# Patient Record
Sex: MALE | Race: WHITE | NOT HISPANIC OR LATINO | Employment: FULL TIME | URBAN - METROPOLITAN AREA
[De-identification: names, ages, dates, MRNs, and addresses within clinical notes are randomized per-mention and may not be internally consistent; named-entity substitution may affect disease eponyms.]

---

## 2017-01-06 ENCOUNTER — OFFICE VISIT (OUTPATIENT)
Dept: URGENT CARE | Facility: URGENT CARE | Age: 22
End: 2017-01-06
Payer: COMMERCIAL

## 2017-01-06 VITALS
HEART RATE: 85 BPM | TEMPERATURE: 98.9 F | SYSTOLIC BLOOD PRESSURE: 118 MMHG | RESPIRATION RATE: 16 BRPM | DIASTOLIC BLOOD PRESSURE: 70 MMHG | OXYGEN SATURATION: 97 %

## 2017-01-06 DIAGNOSIS — J02.9 ACUTE PHARYNGITIS, UNSPECIFIED ETIOLOGY: Primary | ICD-10-CM

## 2017-01-06 DIAGNOSIS — J20.9 ACUTE BRONCHITIS WITH COEXISTING CONDITION REQUIRING PROPHYLACTIC TREATMENT: ICD-10-CM

## 2017-01-06 LAB
DEPRECATED S PYO AG THROAT QL EIA: NORMAL
MICRO REPORT STATUS: NORMAL
SPECIMEN SOURCE: NORMAL

## 2017-01-06 PROCEDURE — 87081 CULTURE SCREEN ONLY: CPT | Performed by: FAMILY MEDICINE

## 2017-01-06 PROCEDURE — 99213 OFFICE O/P EST LOW 20 MIN: CPT | Performed by: FAMILY MEDICINE

## 2017-01-06 PROCEDURE — 87880 STREP A ASSAY W/OPTIC: CPT | Performed by: FAMILY MEDICINE

## 2017-01-06 RX ORDER — ALBUTEROL SULFATE 90 UG/1
1-2 AEROSOL, METERED RESPIRATORY (INHALATION) EVERY 4 HOURS PRN
Qty: 1 INHALER | Refills: 0 | Status: SHIPPED | OUTPATIENT
Start: 2017-01-06 | End: 2018-02-22

## 2017-01-06 RX ORDER — AZITHROMYCIN 500 MG/1
500 TABLET, FILM COATED ORAL DAILY
Qty: 6 TABLET | Refills: 0 | Status: SHIPPED | OUTPATIENT
Start: 2017-01-06 | End: 2017-01-12

## 2017-01-06 NOTE — MR AVS SNAPSHOT
After Visit Summary   1/6/2017    Iraj Leija    MRN: [id number]           Patient Information     Date Of Birth          1995        Visit Information        Provider Department      1/6/2017 12:30 PM Anahy Rodriguez MD [facility]        Today's Diagnoses     Acute pharyngitis, unspecified etiology    -  1     Acute bronchitis with coexisting condition requiring prophylactic treatment           Care Instructions      Acute Bronchitis  Your healthcare provider has told you that you have acute bronchitis. Bronchitis is infection or inflammation of the bronchial tubes (airways in the lungs). Normally, air moves easily in and out of the airways. Bronchitis narrows the airways, making it harder for air to flow in and out of the lungs. This causes symptoms such as shortness of breath, coughing, and wheezing. Bronchitis can be  acute  or  chronic.  Acute means the condition comes on quickly and goes away in a short time. Chronic means a condition lasts a long time and often comes back. Read on to learn more about acute bronchitis.    What causes acute bronchitis?  Acute bronchitis almost always starts as a viral respiratory infection, such as a cold or the flu. Certain factors make it more likely for a cold or flu to turn into bronchitis. These include being very young or very old or having a heart or lung problem. Cigarette smoking also makes bronchitis more likely.  When bronchitis develops, the airways become swollen. The airways may also become infected with bacteria. This is known as a secondary infection.  Diagnosing acute bronchitis  Your healthcare provider will examine you and ask about your symptoms and health history. You may also have a sputum culture to test the fluid in your lungs. Chest X-rays may be done to look for infection in the lungs.  Treating acute bronchitis  Bronchitis usually clears up as the cold or flu goes away. You can help feel better faster by  doing the following:    Take medicine as directed. You may be told to take ibuprofen or other over-the-counter medicines. These help relieve inflammation in your bronchial tubes. Your doctor may prescribe an inhaler to help open up the bronchial tubes. Most of the time, acute bronchitis is caused by a viral infection. Antibiotics are usually not prescribed for viral infections.    Drink plenty of fluids, such as water, juice, or warm soup. Fluids loosen mucus so that you can cough it up. This helps you breathe more easily. Fluids also prevent dehydration.    Make sure you get plenty of rest.    Do not smoke. Do not allow anyone else to smoke in your home.  Recovery and follow-up  Follow up with your doctor as you are told. You will likely feel better in a week or two. But a dry cough can linger beyond that time. Let your doctor know if you still have symptoms (other than a dry cough) after 2 weeks. If you re prone to getting bronchial infections, let your doctor know. And take steps to protect yourself from future infections. These steps include stopping smoking and avoiding tobacco smoke, washing your hands often, and getting a yearly flu shot.  When to call the doctor  Call the doctor if you have any of the following:    Fever of 100.4 F (38.0 C) higher    Symptoms that get worse, or new symptoms    Trouble breathing    Symptoms that don t start to improve within a week, or within 3 days of taking antibiotics     5930-7961 [employer]. [address] [place] [address], [place] [address]. All rights reserved. This information is not intended as a substitute for professional medical care. Always follow your healthcare professional's instructions.              Follow-ups after your visit        Who to contact     If you have questions or need follow up information about today's clinic visit or your schedule please contact [facility] directly at [phone].  Normal or non-critical lab and imaging  "results will be communicated to you by MyChart, letter or phone within 4 business days after the clinic has received the results. If you do not hear from us within 7 days, please contact the clinic through [employer] or phone. If you have a critical or abnormal lab result, we will notify you by phone as soon as possible.  Submit refill requests through [employer] or call your pharmacy and they will forward the refill request to us. Please allow 3 business days for your refill to be completed.          Additional Information About Your Visit        [employer] Information     [employer] lets you send messages to your doctor, view your test results, renew your prescriptions, schedule appointments and more. To sign up, go to [email or web address] . Click on \"Log in\" on the left side of the screen, which will take you to the Welcome page. Then click on \"Sign up Now\" on the right side of the page.     You will be asked to enter the access code listed below, as well as some personal information. Please follow the directions to create your username and password.     Your access code is: [email or web address]  Expires: 2017  1:06 PM     Your access code will  in 90 days. If you need help or a new code, please call your [facility] clinic or [phone].        Care EveryWhere ID     This is your Care EveryWhere ID. This could be used by other organizations to access your [facility] medical records  [id number]        Your Vitals Were     Pulse Temperature Respirations Pulse Oximetry          85 98.9  F (37.2  C) (Oral) 16 97%         Blood Pressure from Last 3 Encounters:   17 118/70   05/15/15 122/60   03/15/12 124/74    Weight from Last 3 Encounters:   05/15/15 161 lb 8 oz (73.256 kg)              We Performed the Following     Beta strep group A culture     Rapid strep screen          Today's Medication Changes          These changes are accurate as of: 17  1:06 PM.  If you have any questions, ask your nurse or doctor.    "            Start taking these medicines.        Dose/Directions    albuterol 108 (90 BASE) MCG/ACT Inhaler   Commonly known as:  PROAIR HFA/PROVENTIL HFA/VENTOLIN HFA   Used for:  Acute bronchitis with coexisting condition requiring prophylactic treatment   Started by:  Anahy Rodriguez MD        Dose:  1-2 puff   Inhale 1-2 puffs into the lungs every 4 hours as needed for shortness of breath / dyspnea or wheezing   Quantity:  1 Inhaler   Refills:  0       azithromycin 500 MG tablet   Commonly known as:  ZITHROMAX   Used for:  Acute bronchitis with coexisting condition requiring prophylactic treatment   Started by:  Anahy Rodriguez MD        Dose:  500 mg   Take 1 tablet (500 mg) by mouth daily for 6 days   Quantity:  6 tablet   Refills:  0            Where to get your medicines      These medications were sent to [facility] - [address]   [address]  [address], [place] [address]     Phone:  [phone]    - albuterol 108 (90 BASE) MCG/ACT Inhaler  - azithromycin 500 MG tablet             Primary Care Provider    None       No address on file        Thank you!     Thank you for choosing [facility]  for your care. Our goal is always to provide you with excellent care. Hearing back from our patients is one way we can continue to improve our services. Please take a few minutes to complete the written survey that you may receive in the mail after your visit with us. Thank you!             Your Updated Medication List - Protect others around you: Learn how to safely use, store and throw away your medicines at www.disposemymeds.org.          This list is accurate as of: 1/6/17  1:06 PM.  Always use your most recent med list.                   Brand Name Dispense Instructions for use    albuterol 108 (90 BASE) MCG/ACT Inhaler    PROAIR HFA/PROVENTIL HFA/VENTOLIN HFA    1 Inhaler    Inhale 1-2 puffs into the lungs every 4 hours as needed for  shortness of breath / dyspnea or wheezing       azithromycin 500 MG tablet    ZITHROMAX    6 tablet    Take 1 tablet (500 mg) by mouth daily for 6 days       CLONAZEPAM PO      Take 0.5 mg by mouth 4 times daily.       PARoxetine 30 MG tablet    PAXIL     Take 30 mg by mouth every morning

## 2017-01-06 NOTE — PROGRESS NOTES
SUBJECTIVE:  Chief Complaint   Patient presents with     Urgent Care     pt c/o ST and pink eye x 1 wk ago--[facility] -RXd eye drops --pt finished those --since then L ear pn and ST still worse when he coughs     Iraj Leija is a 21 year old male who presents to the clinic today with a chief complaint of cough , shortness of breath. and wheezing. for 1 week(s).  Patient denies central chest pain. and pleuritic chest pain  His cough is described as persistent, daytime, nightime, productive of yellow sputum and wheezing.    The patient's symptoms are moderate and worsening.  Associated symptoms include malaise and ear pain. The patient's symptoms are exacerbated by no particular triggers  Patient has been using OTC cough suppressants  to improve symptoms.    No past medical history on file.    ALLERGIES:  Review of patient's allergies indicates no known allergies.      Current Outpatient Prescriptions on File Prior to Visit:  PARoxetine (PAXIL) 30 MG tablet Take 30 mg by mouth every morning   CLONAZEPAM PO Take 0.5 mg by mouth 4 times daily.     No current facility-administered medications on file prior to visit.    Social History   Substance Use Topics     Smoking status: Never Smoker      Smokeless tobacco: Never Used     Alcohol Use: No       Family History   Problem Relation Age of Onset     DIABETES Mother      Depression Mother      Anxiety Disorder Father          ROS  INTEGUMENTARY/SKIN: NEGATIVE for worrisome rashes, moles or lesions  EYES: NEGATIVE for vision changes or irritation  GI: NEGATIVE for nausea, abdominal pain, heartburn, or change in bowel habits    OBJECTIVE:  /70 mmHg  Pulse 85  Temp(Src) 98.9  F (37.2  C) (Oral)  Resp 16  SpO2 97%  GENERAL APPEARANCE: alert, mild distress and cooperative  EYES: EOMI,  PERRL, conjunctiva clear  HENT: ear canals and TM's normal.  Nose and mouth without ulcers, erythema or lesions  NECK: supple, nontender, no lymphadenopathy  RESP:  lungs clear to auscultation - no rales, rhonchi or wheezes  CV: regular rates and rhythm, normal S1 S2, no murmur noted  NEURO: Normal strength and tone, sensory exam grossly normal,  normal speech and mentation  SKIN: no suspicious lesions or rashes       ASSESSMENT:    Acute pharyngitis, unspecified etiology     - Rapid strep screen  - Beta strep group A culture    Acute bronchitis with coexisting condition requiring prophylactic treatment      - azithromycin (ZITHROMAX) 500 MG tablet; Take 1 tablet (500 mg) by mouth daily for 6 days  - albuterol (PROAIR HFA/PROVENTIL HFA/VENTOLIN HFA) 108 (90 BASE) MCG/ACT Inhaler; Inhale 1-2 puffs into the lungs every 4 hours as needed for shortness of breath / dyspnea or wheezing    We discussed that there is an outbreak of pertussis in the [facility], so there is increased risk of developing the illness.  We discussed antibiotic treatment and prophylaxis of pertussis.       Symptomatic measures encouraged, humidified air, plenty of fluids.  Patient may consider OTC expectorant and/or cough suppressant to treat symptoms.  Return if worsening

## 2017-01-06 NOTE — NURSING NOTE
"Chief Complaint   Patient presents with     Urgent Care     pt c/o ST and pink eye x 1 wk ago--[facility] -[facility]d eye drops --pt finished those --since then L ear pn and ST still worse when he coughs      Initial /70 mmHg  Pulse 85  Temp(Src) 98.9  F (37.2  C) (Oral)  Resp 16  SpO2 97% Estimated body mass index is 23.68 kg/(m^2) as calculated from the following:    Height as of 5/15/15: 5' 9.25\" (1.759 m).    Weight as of 5/15/15: 161 lb 8 oz (73.256 kg)..  BP completed using cuff size: edita Saunders CMA ([facility])    "

## 2017-01-06 NOTE — PATIENT INSTRUCTIONS
Acute Bronchitis  Your healthcare provider has told you that you have acute bronchitis. Bronchitis is infection or inflammation of the bronchial tubes (airways in the lungs). Normally, air moves easily in and out of the airways. Bronchitis narrows the airways, making it harder for air to flow in and out of the lungs. This causes symptoms such as shortness of breath, coughing, and wheezing. Bronchitis can be  acute  or  chronic.  Acute means the condition comes on quickly and goes away in a short time. Chronic means a condition lasts a long time and often comes back. Read on to learn more about acute bronchitis.    What causes acute bronchitis?  Acute bronchitis almost always starts as a viral respiratory infection, such as a cold or the flu. Certain factors make it more likely for a cold or flu to turn into bronchitis. These include being very young or very old or having a heart or lung problem. Cigarette smoking also makes bronchitis more likely.  When bronchitis develops, the airways become swollen. The airways may also become infected with bacteria. This is known as a secondary infection.  Diagnosing acute bronchitis  Your healthcare provider will examine you and ask about your symptoms and health history. You may also have a sputum culture to test the fluid in your lungs. Chest X-rays may be done to look for infection in the lungs.  Treating acute bronchitis  Bronchitis usually clears up as the cold or flu goes away. You can help feel better faster by doing the following:    Take medicine as directed. You may be told to take ibuprofen or other over-the-counter medicines. These help relieve inflammation in your bronchial tubes. Your doctor may prescribe an inhaler to help open up the bronchial tubes. Most of the time, acute bronchitis is caused by a viral infection. Antibiotics are usually not prescribed for viral infections.    Drink plenty of fluids, such as water, juice, or warm soup. Fluids loosen mucus so  that you can cough it up. This helps you breathe more easily. Fluids also prevent dehydration.    Make sure you get plenty of rest.    Do not smoke. Do not allow anyone else to smoke in your home.  Recovery and follow-up  Follow up with your doctor as you are told. You will likely feel better in a week or two. But a dry cough can linger beyond that time. Let your doctor know if you still have symptoms (other than a dry cough) after 2 weeks. If you re prone to getting bronchial infections, let your doctor know. And take steps to protect yourself from future infections. These steps include stopping smoking and avoiding tobacco smoke, washing your hands often, and getting a yearly flu shot.  When to call the doctor  Call the doctor if you have any of the following:    Fever of 100.4 F (38.0 C) higher    Symptoms that get worse, or new symptoms    Trouble breathing    Symptoms that don t start to improve within a week, or within 3 days of taking antibiotics     [id number] [employer]. [address] [place] [address], [place] [address]. All rights reserved. This information is not intended as a substitute for professional medical care. Always follow your healthcare professional's instructions.

## 2017-01-08 LAB
BACTERIA SPEC CULT: NORMAL
MICRO REPORT STATUS: NORMAL
SPECIMEN SOURCE: NORMAL

## 2018-02-22 ENCOUNTER — APPOINTMENT (OUTPATIENT)
Dept: GENERAL RADIOLOGY | Facility: CLINIC | Age: 23
End: 2018-02-22
Attending: EMERGENCY MEDICINE
Payer: COMMERCIAL

## 2018-02-22 ENCOUNTER — HOSPITAL ENCOUNTER (EMERGENCY)
Facility: CLINIC | Age: 23
Discharge: HOME | End: 2018-02-22
Attending: EMERGENCY MEDICINE | Admitting: EMERGENCY MEDICINE
Payer: COMMERCIAL

## 2018-02-22 VITALS
SYSTOLIC BLOOD PRESSURE: 142 MMHG | DIASTOLIC BLOOD PRESSURE: 72 MMHG | RESPIRATION RATE: 16 BRPM | OXYGEN SATURATION: 96 % | HEIGHT: 69 IN | WEIGHT: 175 LBS | TEMPERATURE: 98.8 F | BODY MASS INDEX: 25.92 KG/M2 | HEART RATE: 85 BPM

## 2018-02-22 DIAGNOSIS — S93.401A SPRAIN OF RIGHT ANKLE, UNSPECIFIED LIGAMENT, INITIAL ENCOUNTER: ICD-10-CM

## 2018-02-22 PROCEDURE — 73610 X-RAY EXAM OF ANKLE: CPT | Mod: RT

## 2018-02-22 PROCEDURE — 99284 EMERGENCY DEPT VISIT MOD MDM: CPT | Mod: Z6 | Performed by: EMERGENCY MEDICINE

## 2018-02-22 PROCEDURE — 99283 EMERGENCY DEPT VISIT LOW MDM: CPT | Performed by: EMERGENCY MEDICINE

## 2018-02-22 ASSESSMENT — ENCOUNTER SYMPTOMS
ARTHRALGIAS: 1
JOINT SWELLING: 0
NUMBNESS: 0
HEADACHES: 0
LIGHT-HEADEDNESS: 0

## 2018-02-22 NOTE — ED AVS SNAPSHOT
[facility], Emergency Department    [address] [facility] [address]    Phone:  [phone]                                       Iraj Leija   MRN: [id number]    Department:  [facility], Emergency Department   Date of Visit:  2/22/2018           Patient Information     Date Of Birth          1995        Your diagnoses for this visit were:     Sprain of right ankle, unspecified ligament, initial encounter        You were seen by Bk Sofia MD.        Discharge Instructions         ACE Wrap  Minor muscle or joint injuries are often treated with an elastic bandage. The bandage provides support and compression to the injured area. An elastic bandage is a stretchy, rolled bandage. Elastic bandages range in width from 2 to 6 inches. They can be used for a variety of injuries. The bandages are often called ACE bandages, after the most common brand name.  If used correctly, elastic bandages help control swelling and ease pain. An elastic bandage is also a good reminder not to overuse the injured area. However, elastic bandages do not provide a lot of support and will not prevent reinjury.  Home care    To apply an elastic bandage:    Check the skin before wrapping the injury. It should be clean, dry, and free of drainage.    Start wrapping below the injury and work your way toward the body. For an ankle sprain, start wrapping around the foot and work up toward the calf. This will help control swelling.    Overlap the edges of the bandage so it stays snuggly in place.    Wrap the bandage firmly, but not too tightly. A tight bandage can increase swelling on either end of the bandage. Make sure the bandage is wrinkle free.    Leave fingers and toes exposed.    Secure ends of the bandage (even self-sticking ones) with clips or tape.    Check frequently to ensure adequate circulation, especially in the fingers and toes. Loosen the bandage if there is local swelling, numbness, tingling,  discomfort, coldness, or discoloration (skin pale or bluish in color).    Rewrap the bandage as needed during the day. Reroll the bandage as you unwind it.  Continue using the elastic bandage until the pain and swelling are gone or as your healthcare provider advises.  If you have been told to ice the area, the ice can be secured in place with the elastic bandage. Wrap the ice pack with a thin towel to protect the skin. Do not put ice or an ice pack directly on the skin.  Ice the area for no more than 20 minutes at a time.    Follow-up care  Follow up with your healthcare provider, as advised.  When to seek medical advice  Call your healthcare provider for any of the following:    Pain and swelling that doesn't get better or gets worse    Trouble moving injured area    Skin discoloration, numbness, or tingling that doesn t go away after bandage is removed  Date Last Reviewed: 9/13/2015 2000-2017 [employer]. [address] [place] [address]. All rights reserved. This information is not intended as a substitute for professional medical care. Always follow your healthcare professional's instructions.          Understanding Ankle Sprain    The ankle is the joint where the leg and foot meet. Bones are held in place by connective tissue called ligaments. When ankle ligaments are stretched to the point of pain and injury, it is called an ankle sprain. A sprain can tear the ligaments. These tears can be very small but still cause pain. Ankle sprains can be mild or severe.  What causes an ankle sprain?  A sprain may occur when you twist your ankle or bend it too far. This can happen when you stumble or fall. Things that can make an ankle sprain more likely include:    Having had an ankle sprain before    Playing sports that involve running and jumping. Or playing contact sports such as football or hockey.    Wearing shoes that don t support your feet and ankles well    Having ankles with poor  strength and flexibility  Symptoms of an ankle sprain  Symptoms may include:    Pain or soreness in the ankle    Swelling    Redness or bruising    Not being able to walk or put weight on the affected foot    Reduced range of motion in the ankle    A popping or tearing feeling at the time the sprain occurs    An abnormal or dislocated look to the ankle    Instability or too much range of motion in the ankle  Treatment for an ankle sprain  Treatment focuses on reducing pain and swelling, and avoiding further injury. Treatments may include:    Resting the ankle. Avoid putting weight on it. This may mean using crutches until the sprain heals.    Prescription or over-the-counter pain medicines. These help reduce swelling and pain.    Cold packs. These help reduce pain and swelling.    Raising your ankle above your heart. This helps reduce swelling.    Wrapping the ankle with an elastic bandage or ankle brace. This helps reduce swelling and gives some support to the ankle. In rare cases, you may need a cast or boot.    Stretching and other exercises. These improve flexibility and strength.    Heat packs. These may be recommended before doing ankle exercises.  Possible complications of an ankle sprain  An ankle that has been weakened by a sprain can be more likely to have repeated sprains afterward. Doing exercises to strengthen your ankle and improve balance can reduce your risk for repeated sprains. Other possible complications are long-term (chronic) pain or an ankle that remains unstable.  When to call your healthcare provider  Call your healthcare provider right away if you have any of these:    Fever of 100.4 F (38 C) or higher, or as directed    Pain, numbness, discoloration, or coldness in the foot or toes    Pain that gets worse    Symptoms that don t get better, or get worse    New symptoms   Date Last Reviewed: 3/10/2016    9262-8925 [employer]. [address] [facility], [place] [address]. All  rights reserved. This information is not intended as a substitute for professional medical care. Always follow your healthcare professional's instructions.          24 Hour Appointment Hotline       To make an appointment at any [facility], call [phone] ([phone]). If you don't have a family doctor or clinic, we will help you find one. [facility] clinics are conveniently located to serve the needs of you and your family.             Review of your medicines      Our records show that you are taking the medicines listed below. If these are incorrect, please call your family doctor or clinic.        Dose / Directions Last dose taken    CLONAZEPAM PO   Dose:  0.5 mg        Take 0.5 mg by mouth 4 times daily.   Refills:  0        PARoxetine 30 MG tablet   Commonly known as:  PAXIL   Dose:  30 mg        Take 30 mg by mouth every morning   Refills:  0                Procedures and tests performed during your visit     XR Ankle Right G/E 3 Views      Orders Needing Specimen Collection     None      Pending Results     No orders found from 2/20/2018 to 2/23/2018.            Pending Culture Results     No orders found from 2/20/2018 to 2/23/2018.            Pending Results Instructions     If you had any lab results that were not finalized at the time of your Discharge, you can call the ED Lab Result RN at [phone]. You will be contacted by this team for any positive Lab results or changes in treatment. The nurses are available 7 days a week from 10A to 6:30P.  You can leave a message 24 hours per day and they will return your call.        Thank you for choosing [facility]       Thank you for choosing [facility] for your care. Our goal is always to provide you with excellent care. Hearing back from our patients is one way we can continue to improve our services. Please take a few minutes to complete the written survey that you may receive in the mail after you visit with us. Thank you!        Jaylen  "Information     [employer] lets you send messages to your doctor, view your test results, renew your prescriptions, schedule appointments and more. To sign up, go to [email or web address] . Click on \"Log in\" on the left side of the screen, which will take you to the Welcome page. Then click on \"Sign up Now\" on the right side of the page.     You will be asked to enter the access code listed below, as well as some personal information. Please follow the directions to create your username and password.     Your access code is: [email or web address]  Expires: 2018  9:44 PM     Your access code will  in 90 days. If you need help or a new code, please call your [facility] clinic or [phone].        Care EveryWhere ID     This is your Care EveryWhere ID. This could be used by other organizations to access your [facility] medical records  [id number]        Equal Access to Services     NADYA VELOZ AH: Hadii syeda liuo Sokhang, waaxda luqadaha, qaybta kaalmada adesuzanne, bora tineo . So [facility] [phone].    ATENCIÓN: Si habla español, tiene a conti disposición servicios gratuitos de asistencia lingüística. Llame al [phone].    We comply with applicable federal civil rights laws and [place] laws. We do not discriminate on the basis of race, color, national origin, age, disability, sex, sexual orientation, or gender identity.            After Visit Summary       This is your record. Keep this with you and show to your community pharmacist(s) and doctor(s) at your next visit.                  "

## 2018-02-22 NOTE — ED AVS SNAPSHOT
[facility], [place], Emergency Department    [address] [place] [address]    Phone:  [phone]                                       Iraj Leija   MRN: [id number]    Department:  [facility], Emergency Department   Date of Visit:  2/22/2018           After Visit Summary Signature Page     I have received my discharge instructions, and my questions have been answered. I have discussed any challenges I see with this plan with the nurse or doctor.    ..........................................................................................................................................  Patient/Patient Representative Signature      ..........................................................................................................................................  Patient Representative Print Name and Relationship to Patient    ..................................................               ................................................  Date                                            Time    ..........................................................................................................................................  Reviewed by Signature/Title    ...................................................              ..............................................  Date                                                            Time

## 2018-02-23 NOTE — ED PROVIDER NOTES
History     Chief Complaint   Patient presents with     Ankle Pain     HPI  Iraj Leija is an otherwise healthy 22 year old male who presents to the ED for evaluation of right ankle pain. The patient was walking this evening when he suffered an inversion-type injury of his right ankle, causing him significant pain with ambulation and weight bearing. The patient was ambulatory immediatly following the injury, and he was able to walk into the ED. No swelling, numbness, or tingling, and he reports pain over the lateral aspect of his right ankle and achilles tendon. The patient offers no other concerns or complaints at this time.      PAST MEDICAL HISTORY:   Past Medical History:   Diagnosis Date     Depressive disorder        PAST SURGICAL HISTORY:   Past Surgical History:   Procedure Laterality Date     ADENOIDECTOMY      as a child       FAMILY HISTORY:   Family History   Problem Relation Age of Onset     DIABETES Mother      Depression Mother      Anxiety Disorder Father        SOCIAL HISTORY:   Social History   Substance Use Topics     Smoking status: Never Smoker     Smokeless tobacco: Never Used     Alcohol use 0.0 oz/week     0 Standard drinks or equivalent per week      Comment: 2-3 beers/wk       Discharge Medication List as of 2/22/2018  9:44 PM      CONTINUE these medications which have NOT CHANGED    Details   PARoxetine (PAXIL) 30 MG tablet Take 30 mg by mouth every morning, Historical      CLONAZEPAM PO Take 0.5 mg by mouth 4 times daily., 0.5 mg, Oral, 4 TIMES DAILY, Until Discontinued, Historical              No Known Allergies        I have reviewed the Medications, Allergies, Past Medical and Surgical History, and Social History in the Epic system.    Review of Systems   Musculoskeletal: Positive for arthralgias. Negative for joint swelling.   Neurological: Negative for syncope, light-headedness, numbness and headaches.   All other systems reviewed and are negative.      Physical Exam   BP:  "142/72  Pulse: 85  Temp: 98.8  F (37.1  C)  Resp: 16  Height: 175.3 cm (5' 9\")  Weight: 79.4 kg (175 lb)  SpO2: 96 %      Physical Exam   Constitutional: He is oriented to person, place, and time. He appears well-developed and well-nourished. No distress.   HENT:   Head: Normocephalic and atraumatic.   Eyes: No scleral icterus.   Neck: Normal range of motion. Neck supple.   Cardiovascular: Normal rate.    Pulmonary/Chest: Effort normal.   Musculoskeletal:        Right ankle: Normal.   Neurological: He is alert and oriented to person, place, and time.   Skin: Skin is warm and dry. No rash noted. He is not diaphoretic. No erythema. No pallor.       ED Course     ED Course     Procedures             Critical Care time:  none     Results for orders placed or performed during the hospital encounter of 02/22/18   XR Ankle Right G/E 3 Views    Narrative    XR ANKLE RT G/E 3 VW  2/22/2018 8:53 PM    History: Trauma    Comparison: None available    Findings:   AP, oblique, and lateral views of the right ankle are obtained. The  alignment at the ankle joint is maintained. No medial clear space  widening. No acute fractures identified. There is a normal variant  accessory os trigonum posterior to the talus on the lateral view.  There is no significant soft tissue abnormality.      Impression    IMPRESSION:  No acute osseous abnormality.    I have personally reviewed the examination and initial interpretation  and I agree with the findings.    RUDDY MURPHY MD               Labs Ordered and Resulted from Time of ED Arrival Up to the Time of Departure from the ED - No data to display         Assessments & Plan (with Medical Decision Making)   This is a 22-year-old male coming into emergency room with complaints of right ankle pain after slipping on some ice.  Initially he states that he was able to bear weight but was very tender.  On my assessment here in the emergency room he is now able to bear weight and has no obvious " limp.  His ankle exam shows no obvious swelling or tenderness throughout.  X-ray confirms there is no acute fracture.  At this time he believes that he is comfortable with a single Ace wrap and can be discharged without crutches.  Recommend Motrin and Tylenol for discomfort and follow-up with his primary care doctor for any further concerns.    I have reviewed the nursing notes.    I have reviewed the findings, diagnosis, plan and need for follow up with the patient.    Discharge Medication List as of 2/22/2018  9:44 PM          Final diagnoses:   Sprain of right ankle, unspecified ligament, initial encounter   I, Asa Johnson, am serving as a trained medical scribe to document services personally performed by Bk Sofia MD, based on the provider's statements to me.      IBk MD, was physically present and have reviewed and verified the accuracy of this note documented by Asa Johnson.       2/22/2018   [facility], [place], EMERGENCY DEPARTMENT     Bk Sofia MD  02/26/18 1926

## 2018-02-23 NOTE — DISCHARGE INSTRUCTIONS
ACE Wrap  Minor muscle or joint injuries are often treated with an elastic bandage. The bandage provides support and compression to the injured area. An elastic bandage is a stretchy, rolled bandage. Elastic bandages range in width from 2 to 6 inches. They can be used for a variety of injuries. The bandages are often called ACE bandages, after the most common brand name.  If used correctly, elastic bandages help control swelling and ease pain. An elastic bandage is also a good reminder not to overuse the injured area. However, elastic bandages do not provide a lot of support and will not prevent reinjury.  Home care    To apply an elastic bandage:    Check the skin before wrapping the injury. It should be clean, dry, and free of drainage.    Start wrapping below the injury and work your way toward the body. For an ankle sprain, start wrapping around the foot and work up toward the calf. This will help control swelling.    Overlap the edges of the bandage so it stays snuggly in place.    Wrap the bandage firmly, but not too tightly. A tight bandage can increase swelling on either end of the bandage. Make sure the bandage is wrinkle free.    Leave fingers and toes exposed.    Secure ends of the bandage (even self-sticking ones) with clips or tape.    Check frequently to ensure adequate circulation, especially in the fingers and toes. Loosen the bandage if there is local swelling, numbness, tingling, discomfort, coldness, or discoloration (skin pale or bluish in color).    Rewrap the bandage as needed during the day. Reroll the bandage as you unwind it.  Continue using the elastic bandage until the pain and swelling are gone or as your healthcare provider advises.  If you have been told to ice the area, the ice can be secured in place with the elastic bandage. Wrap the ice pack with a thin towel to protect the skin. Do not put ice or an ice pack directly on the skin.  Ice the area for no more than 20 minutes at a  time.    Follow-up care  Follow up with your healthcare provider, as advised.  When to seek medical advice  Call your healthcare provider for any of the following:    Pain and swelling that doesn't get better or gets worse    Trouble moving injured area    Skin discoloration, numbness, or tingling that doesn t go away after bandage is removed  Date Last Reviewed: 9/13/2015 2000-2017 [employer]. [address] [place] [address], [place] [address]. All rights reserved. This information is not intended as a substitute for professional medical care. Always follow your healthcare professional's instructions.          Understanding Ankle Sprain    The ankle is the joint where the leg and foot meet. Bones are held in place by connective tissue called ligaments. When ankle ligaments are stretched to the point of pain and injury, it is called an ankle sprain. A sprain can tear the ligaments. These tears can be very small but still cause pain. Ankle sprains can be mild or severe.  What causes an ankle sprain?  A sprain may occur when you twist your ankle or bend it too far. This can happen when you stumble or fall. Things that can make an ankle sprain more likely include:    Having had an ankle sprain before    Playing sports that involve running and jumping. Or playing contact sports such as football or hockey.    Wearing shoes that don t support your feet and ankles well    Having ankles with poor strength and flexibility  Symptoms of an ankle sprain  Symptoms may include:    Pain or soreness in the ankle    Swelling    Redness or bruising    Not being able to walk or put weight on the affected foot    Reduced range of motion in the ankle    A popping or tearing feeling at the time the sprain occurs    An abnormal or dislocated look to the ankle    Instability or too much range of motion in the ankle  Treatment for an ankle sprain  Treatment focuses on reducing pain and swelling, and avoiding further injury.  Treatments may include:    Resting the ankle. Avoid putting weight on it. This may mean using crutches until the sprain heals.    Prescription or over-the-counter pain medicines. These help reduce swelling and pain.    Cold packs. These help reduce pain and swelling.    Raising your ankle above your heart. This helps reduce swelling.    Wrapping the ankle with an elastic bandage or ankle brace. This helps reduce swelling and gives some support to the ankle. In rare cases, you may need a cast or boot.    Stretching and other exercises. These improve flexibility and strength.    Heat packs. These may be recommended before doing ankle exercises.  Possible complications of an ankle sprain  An ankle that has been weakened by a sprain can be more likely to have repeated sprains afterward. Doing exercises to strengthen your ankle and improve balance can reduce your risk for repeated sprains. Other possible complications are long-term (chronic) pain or an ankle that remains unstable.  When to call your healthcare provider  Call your healthcare provider right away if you have any of these:    Fever of 100.4 F (38 C) or higher, or as directed    Pain, numbness, discoloration, or coldness in the foot or toes    Pain that gets worse    Symptoms that don t get better, or get worse    New symptoms   Date Last Reviewed: 3/10/2016    5492-9662 [employer]. [address] [place] [address], [place] [address]. All rights reserved. This information is not intended as a substitute for professional medical care. Always follow your healthcare professional's instructions.

## 2023-06-15 ENCOUNTER — LAB (OUTPATIENT)
Dept: LAB | Facility: CLINIC | Age: 28
End: 2023-06-15
Payer: COMMERCIAL

## 2023-06-15 DIAGNOSIS — Z31.9 UNSPECIFIED PROCREATIVE MANAGEMENT: ICD-10-CM

## 2023-06-15 PROCEDURE — 89322 SEMEN ANAL STRICT CRITERIA: CPT

## 2023-06-19 LAB
ABNORMAL SPERM MORPHOLOGY: 94
ABSTINENCE DAYS: 5 DAYS (ref 2–7)
AGGLUTINATION: NO
ANALYSIS TEMP - CENTIGRADE: 22 CENTIGRADE
COLLECTION METHOD: NORMAL
COLLECTION SITE: NORMAL
CONSENT TO RELEASE TO PARTNER: YES
DAL- RECEIVED TIME: NORMAL
HEAD DEFECT: 94 %
IMMOTILE: 27 %
LIQUEFIED: YES
MIDPIECE DEFECT: 28 %
NON-PROGRESSIVE MOTILITY: 2 %
NORMAL SPERM MORPHOLOGY: 6 % NORMAL FORMS
PROGRESSIVE MOTILITY: 71 %
ROUND CELLS: 0.1 MILLION/ML
SPECIMEN PH: 7.2 PH
SPECIMEN VOLUME: 4.5 ML
SPERM CONCENTRATION: 170 MILLION/ML
TAIL DEFECT: 3 %
TIME OF ANALYSIS: NORMAL
TOTAL PROGRESSIVE MOTILE NUMBER: 543 MILLION
TOTAL SPERM NUMBER: 765 MILLION
VISCOUS: NO
VITALITY: NORMAL

## 2024-09-09 ENCOUNTER — HOSPITAL ENCOUNTER (EMERGENCY)
Facility: CLINIC | Age: 29
Discharge: HOME | End: 2024-09-09
Attending: EMERGENCY MEDICINE | Admitting: EMERGENCY MEDICINE
Payer: COMMERCIAL

## 2024-09-09 VITALS
HEIGHT: 70 IN | OXYGEN SATURATION: 99 % | HEART RATE: 74 BPM | RESPIRATION RATE: 16 BRPM | DIASTOLIC BLOOD PRESSURE: 78 MMHG | SYSTOLIC BLOOD PRESSURE: 125 MMHG | BODY MASS INDEX: 26.77 KG/M2 | WEIGHT: 187 LBS | TEMPERATURE: 98.2 F

## 2024-09-09 DIAGNOSIS — F33.2 MDD (MAJOR DEPRESSIVE DISORDER), RECURRENT SEVERE, WITHOUT PSYCHOSIS (H): ICD-10-CM

## 2024-09-09 DIAGNOSIS — R45.851 SUICIDAL IDEATION: ICD-10-CM

## 2024-09-09 DIAGNOSIS — F41.1 GAD (GENERALIZED ANXIETY DISORDER): ICD-10-CM

## 2024-09-09 PROBLEM — F32.9 MAJOR DEPRESSION: Status: ACTIVE | Noted: 2024-09-09

## 2024-09-09 PROCEDURE — 99283 EMERGENCY DEPT VISIT LOW MDM: CPT

## 2024-09-09 PROCEDURE — 99204 OFFICE O/P NEW MOD 45 MIN: CPT | Performed by: PSYCHIATRY & NEUROLOGY

## 2024-09-09 PROCEDURE — 250N000013 HC RX MED GY IP 250 OP 250 PS 637: Performed by: PSYCHIATRY & NEUROLOGY

## 2024-09-09 RX ORDER — MIRTAZAPINE 15 MG/1
15 TABLET, FILM COATED ORAL AT BEDTIME
Qty: 30 TABLET | Refills: 0 | Status: SHIPPED | OUTPATIENT
Start: 2024-09-09

## 2024-09-09 RX ORDER — PAROXETINE 40 MG/1
60 TABLET, FILM COATED ORAL AT BEDTIME
COMMUNITY
Start: 2024-09-07

## 2024-09-09 RX ORDER — CLONAZEPAM 0.5 MG/1
0.5 TABLET ORAL 4 TIMES DAILY PRN
COMMUNITY
Start: 2024-09-04

## 2024-09-09 RX ORDER — BUSPIRONE HYDROCHLORIDE 15 MG/1
15 TABLET ORAL 2 TIMES DAILY
COMMUNITY

## 2024-09-09 RX ORDER — CLONAZEPAM 0.5 MG/1
0.5 TABLET ORAL 4 TIMES DAILY PRN
Status: DISCONTINUED | OUTPATIENT
Start: 2024-09-09 | End: 2024-09-09 | Stop reason: HOSPADM

## 2024-09-09 RX ADMIN — CLONAZEPAM 0.5 MG: 0.5 TABLET ORAL at 13:40

## 2024-09-09 ASSESSMENT — ACTIVITIES OF DAILY LIVING (ADL)
ADLS_ACUITY_SCORE: 35

## 2024-09-09 NOTE — CONSULTS
Diagnostic Evaluation Consultation  Crisis Assessment    Patient Name: Iraj Leija  Age:  29 year old  Legal Sex: male  Gender Identity: male  Pronouns:   Race: White  Ethnicity: Not  or   Language: English      Patient was assessed: Virtual: QuickMobile   Crisis Assessment Start Date: 09/09/24  Crisis Assessment Start Time: 1210  Crisis Assessment Stop Time: 1241  Patient location: [facility] EMERGENCY DEPT                             [id number]    Referral Data and Chief Complaint  Iraj Leija presents to the ED by  self (Pt drove himself to the ER.). Patient is presenting to the ED for the following concerns: Depression, Anxiety, Worsening psychosocial stress.   Factors that make the mental health crisis life threatening or complex are:  Pt reports over the past week he has been experiencing increased severity of depression with some anxiety. He believes the depression has been worse than the anxiety. He has been having intermittent thoughts of suicide and considers carbon monoxide poison as a method. He mother struggled with anxiety and depression and she ended her life in 2017. He feels like he has not really processed her death and possibly his increased symptoms are related to her death. He denies having intent. He feels guilty that he has been thinking about suicide. He wants the pain to end but he wants to live..      Informed Consent and Assessment Methods  Explained the crisis assessment process, including applicable information disclosures and limits to confidentiality, assessed understanding of the process, and obtained consent to proceed with the assessment.  Assessment methods included conducting a formal interview with patient, review of medical records, collaboration with medical staff, and obtaining relevant collateral information from family and community providers when available.  : done     Patient response to interventions: acceptance expressed, verbalizes  understanding  Coping skills were attempted to reduce the crisis:  Pt talks to his father and wife and recently called the suicide hotline. He tries to go on walks or will go to the gym. He describes himself as trying to push through it.     History of the Crisis   Pt presents to the ER on his own by private automobile. He reports to have a longstanding history of depression and anxiety he has been experiencing since back in middle school. Both his mother and father have dealt with the depression and anxiety. His father has since found a way to manage his symptoms but Pt mother  by suicide in 2017. Pt reports that he does have great support from his wife, father, and wife's family. He is employed full time and says he has many things to be greateful for. His father knows he came to the ER today but his wife doesn't know yet. He says she is a  and he doesn't want to be a burden on her. He says he is anxious about telling her he is at the hospital because he doesn't want to cause her anxiety or fear. He denies any previous IPMH but when he was 16 his mother did bring him to a hospital for the anxiety but he was not hospitalized. He denies history for sucide attempts. No NSSI.    Brief Psychosocial History  Family:  , Children    Support System:  Wife, Parent(s), Sibling(s), Other (specify) (coworkers, wife's family members are supportive)  Employment Status:  employed full-time  Source of Income:  salary/wages  Financial Environmental Concerns:  none  Current Hobbies:  family functions, exercise/fitness, social media/computer activities, television/movies/videos  Barriers in Personal Life:  emotional concerns, mental health concerns    Significant Clinical History  Current Anxiety Symptoms:  anxious, excessive worry  Current Depression/Trauma:  avoidance, sense of doom, hopelessness, helplessness, sadness, thoughts of death/suicide, difficulty concentrating, withdrawl/isolation  Current  Somatic Symptoms:  anxious  Current Psychosis/Thought Disturbance:     Current Eating Symptoms:  loss of appetite  Chemical Use History:  Alcohol: Social  Last Use::  (He says he might drink 2 or 3 drinks per week but lately he has not had a desire or interest in drinking.)  Benzodiazepines: None  Opiates: None  Cocaine: None  Marijuana: None  Other Use: None   Past diagnosis:  Anxiety Disorder, Depression  Family history:  Anxiety Disorder, Depression, Death by Suicide  Past treatment:  Individual therapy, Psychiatric Medication Management, Primary Care  Details of most recent treatment:  Pt has a therapist (Kelly Ford) and psychiatrist (Dr. Robertson) at [facility]. He sees a therapist a couple of times per month and wonders of he should increase his sessions. He saw the therapist last Thursday and is scheduled to see the psychiatrist during the first week of October. He takes Paxil Clonopin, and Buspar.  Other relevant history:  He is worried about letting his wife know he is at the hospital. She is aware of his anxiety and depression but likely not aware of the severity and suicidal ideation. He feels bad and guilty that he has been having thoughts of suicide. He does not want to kill himself and doesn't want to be a burden or hurt his family. He wonders how or what he can do to feel better and move beyond his anxiety and depression. He wants to feel better and be happy.       Collateral Information  Is there collateral information: Yes     Collateral information name, relationship, phone number:  Baljit Leija (Father)  [phone] -- [phone]    What happened today: He has struggled with anxiety through middle and high school. Off and on as an adult he has periods or episodes where his anxiety and depression have been more difficult to manage. We talk and text alot about what he is going through and I try to be supportive as I too went through all of this but now seem to have things under  control.     What is different about patient's functioning: His mother suffered from anxiety and depression for many years until she ended her life. He is worried he is going down the same path as his mom. He reports that lately he has been feeling more sad, hopeless, and depressed. He will do great for several months and then will have an episode. I am thankful he is reaching out for help and trying to manage with therapy and medication.     Has patient made comments about wanting to kill themselves/others:      If d/c is recommended, can they take part in safety/aftercare planning:  yes       Risk Assessment  Woodson Suicide Severity Rating Scale Full Clinical Version:  Suicidal Ideation  Q1 Wish to be Dead (Lifetime): Yes  Q2 Non-Specific Active Suicidal Thoughts (Lifetime): Yes  3. Active Suicidal Ideation with any Methods (Not Plan) Without Intent to Act (Lifetime): Yes  Q4 Active Suicidal Ideation with Some Intent to Act, Without Specific Plan (Lifetime): Yes  Q5 Active Suicidal Ideation with Specific Plan and Intent (Lifetime): Yes  Q6 Suicide Behavior (Lifetime): no     Suicidal Behavior (Lifetime)  Actual Attempt (Lifetime): No  Has subject engaged in non-suicidal self-injurious behavior? (Lifetime): No  Interrupted Attempts (Lifetime): No  Aborted or Self-Interrupted Attempt (Lifetime): No  Preparatory Acts or Behavior (Lifetime): No    Woodson Suicide Severity Rating Scale Recent:   Suicidal Ideation (Recent)  Q1 Wished to be Dead (Past Month): yes (Over the past week his suicide thoughts have been worsening. He has been thinking about ending his life by carbon monoxide poisoning. He denies having intent. He says he just wishes the pain would go away.)  Q2 Suicidal Thoughts (Past Month): yes  Q3 Suicidal Thought Method: yes  Q4 Suicidal Intent without Specific Plan: no  Q5 Suicide Intent with Specific Plan: no  Level of Risk per Screen: moderate risk  Intensity of Ideation (Recent)  Most Severe Ideation  Rating (Past 1 Month): 4  Description of Most Severe Ideation (Past 1 Month): Daily thoughts which are becoming more severe.  Frequency (Past 1 Month): Daily or almost daily  Duration (Past 1 Month): Less than 1 hour/some of the time  Controllability (Past 1 Month): Can control thoughts with a lot of difficulty  Deterrents (Past 1 Month): Deterrents definitely stopped you from attempting suicide  Reasons for Ideation (Past 1 Month): Mostly to end or stop the pain (You couldn't go on living with the pain or how you were feeling)  Suicidal Behavior (Recent)  Actual Attempt (Past 3 Months): No  Total Number of Actual Attempts (Past 3 Months): 0  Has subject engaged in non-suicidal self-injurious behavior? (Past 3 Months): No  Interrupted Attempts (Past 3 Months): No  Total Number of Interrupted Attempts (Past 3 Months): 0  Aborted or Self-Interrupted Attempt (Past 3 Months): No  Total Number of Aborted or Self-Interrupted Attempts (Past 3 Months): 0  Total Number of Preparatory Acts (Past 3 Months): 0    Environmental or Psychosocial Events: helplessness/hopelessness, other life stressors, other (see comment) (He says he has probably never really processed and move on  from his mother's death.)  Protective Factors: Protective Factors: strong bond to family unit, community support, or employment, responsibilities and duties to others, including pets and children, lives in a responsibly safe and stable environment, sense of importance of health and wellness, help seeking    Does the patient have thoughts of harming others? Feels Like Hurting Others: no  Previous Attempt to Hurt Others: no  Current presentation:  (Pt is calm, cooperative, alert, and oriented x5. He is pleasant and looking for help.)  Is the patient engaging in sexually inappropriate behavior?: no    Is the patient engaging in sexually inappropriate behavior?  no        Mental Status Exam   Affect: Flat  Appearance: Appropriate  Attention  Span/Concentration: Attentive  Eye Contact: Engaged    Fund of Knowledge: Appropriate   Language /Speech Content: Fluent  Language /Speech Volume: Normal  Language /Speech Rate/Productions: Normal  Recent Memory: Intact  Remote Memory: Intact  Mood: Depressed, Sad  Orientation to Person: Yes   Orientation to Place: Yes  Orientation to Time of Day: Yes  Orientation to Date: Yes     Situation (Do they understand why they are here?): Yes  Psychomotor Behavior: Normal  Thought Content: Suicidal  Thought Form: Goal Directed       Medication  Psychotropic medications:   Medication Orders - Psychiatric (From admission, onward)      None             Current Care Team  Patient Care Team:  No Ref-Primary, Physician as PCP - General    Diagnosis  Patient Active Problem List   Diagnosis Code    PAULA (generalized anxiety disorder) F41.1    Major depression F32.9       Primary Problem This Admission  Active Hospital Problems    *Major depression      PAULA (generalized anxiety disorder)    F32.9      Clinical Summary and Substantiation of Recommendations   Pt presents with worsening of suicidal ideation with depression and anxiety. He feels hopeless about feeling the way he is and feels like a burden to others. He reports suicidal ideation for at least the past week and describes these as being intrusive and daily at times. He has been working with a therapist and is compliant with medication management. He reports this current episode of depression and anxiety are becoming more debilitating to where he has been losing motivation and interest. Says he just feels tired and exhaused with life. After therapeutic assessment, intervention and aftercare planning by ED care team and LM and in consultation with the attending provider, the patient's circumstances and mental state were appropriate for inpatient behavioral health. Patient is recommended by this clinician to admit IP  for safety and stabilization.    Patient coping skills  attempted to reduce the crisis:  Pt talks to his father and wife and recently called the suicide hotline. He tries to go on walks or will go to the gym. He describes himself as trying to push through it.    Disposition  Recommended disposition: Observation        Reviewed case and recommendations with attending provider. Attending Name: Consulted with Dr Winter and discussed disposition of Observation.       Attending concurs with disposition: yes       Patient and/or validated legal guardian concurs with disposition:   yes       Final disposition:  observation    Legal status on admission: Voluntary/Patient has signed consent for treatment    Assessment Details   Total duration spent with the patient: 31 min     CPT code(s) utilized: 33617 - Psychotherapy for Crisis - 60 (30-74*) min    Primo Pearce [facility], Psychotherapist  DEC - Triage & Transition Services  Callback: [phone]

## 2024-09-09 NOTE — PROGRESS NOTES
Patient agreeable to discharge plan. Discharge instructions reviewed with patient including follow-up care plan. Medications: reviewed. Reviewed safety plan and outpatient resources. Denies SI and HI. All belongings that were brought into the hospital have been returned to patient. Escorted off the unit at 1518 accompanied by [facility] staff. Discharged to home via private transportation.

## 2024-09-09 NOTE — ED NOTES
[facility]  ED to EMPATH Checklist:      Goal for EMPATH: Depression management, Anxiety management, Suicidality,  Referral and resources, and Time to stabilize    Current Behavior: Flat Affect    Safety Concerns: Suicidal, with a plan to    Legal Hold Status: Voluntary    Medically Cleared by ED provider: Yes    Patient Therapeutically Searched: Therapeutic search by ED staff (strings, belts, shoes, pockets, electronics, etc.)    Belongings: Remain with patient    Independent Ambulation at Baseline: Yes/No: Yes    Participates in Care/Conversation: Yes/No: Yes    Patient Informed about EMPATH: Yes/No: Yes    DEC: Ordered and pending    Patient Ready to be Transferred to EMPATH? Yes/No: Yes

## 2024-09-09 NOTE — DISCHARGE INSTRUCTIONS
Coordinators from Behavioral Healthcare Providers ([facility]) will be calling you in the next 24-48 hours to ensure that you have the resources you need. For additonal need of mental health services, you can also contact [facility] coordinators directly at [phone].    Recommendations:    1)  Take all medications as prescribed by psychiatrist  2)  Continue to follow up with your therapist and psychiatrist at [facility]  3)  Contact [facility] for assistance for scheduling of intensive outpatient services  [phone]    If I am feeling unsafe or I am in a crisis, I will:  - Contact my established care providers   - Call the National Suicide Prevention Lifeline: 115.720.8589   - [place] CRISIS TEXT Line 960716  - Go to the nearest emergency room   - Call 911 or 198 or 211    Below is a list of FREE Mental Health Options in the [facility] Area:    [facility] Acute Psychiatric Services (APS) provides emergency services, 24 hours a day 7 days a week, to people experiencing mental health crises, including psychosis, depression, violence or suicide, and other crisis situations. No appointment is needed; you can walk in or be referred from an outside agency or person. [address] [place] [address].     [facility]     Serves those in emotional crisis with 24-hour, seven-day-a-week crisis counseling, assessment, referral, and medication management.  Suicidal: [phone] Consultation: [phone]     Walk-in Counseling Center  [phone]  Serves those in need of free outpatient mental health care.  Walk in anytime Monday- Friday 9am to 9pm (no appointments needed, no need to call ahead).  Serve [facility] residents 18+, focusing on needs related to mental health and substance use disorder.  The interdisciplinary team at the walk-in center will take time to get to know you, address your immediate needs and connect you to long-term treatment and recovery support. We'll work  "alongside you until you are connected to meaningful resources and supports.    Peer Support  Call the [facility] Helpline at  [phone]  Or text \"HelpLine\" mz 95423               "

## 2024-09-09 NOTE — PROGRESS NOTES
"Pt comes in with increased anxiety, depression and passive suicidal ideation. He reports his anxiety can trigger his suicidal ideation and he is afraid that he might act on those thoughts. Pt reports he is  and he reports \"not sure how I will tell my wife about this visit\". Pt is tearful at times but cooperative with intake process. He reports he takes all of his medications as prescribed. He denies any drug  use. Denies any hallucinations.   "

## 2024-09-09 NOTE — ED PROVIDER NOTES
"  Emergency Department Note      History of Present Illness     Chief Complaint  Suicidal    HPI  Iraj Leija is a 29 year old male who presents the emergency room with appears to be significant anxiety that unfortunately seem to be progressing into suicidal ideation.  It sounds like his mother committed suicide at some point in the past and he is very concerned that he may go that route.  He states that he does have a plan, and he is afraid that if he has a bad day he may act on that plan but is here voluntarily after calling the National suicide hotline.  He states has no medical concerns, with anxiety, it sounds like one of his triggers is nausea and vomiting and he had an episode like this about a month ago.  No medical complaint at this time, has not taken any steps to harm himself.      Independent Historian  No    Review of External Notes  Yes I have reviewed the patient's last office visit on 27 October of 2020 with the patient was seen for a well exam as well as generalized anxiety disorder and panic attack.      Past Medical History   Medical History and Problem List  Past Medical History:   Diagnosis Date    Depressive disorder        Medications  CLONAZEPAM PO  PARoxetine (PAXIL) 30 MG tablet        Surgical History   Past Surgical History:   Procedure Laterality Date    ADENOIDECTOMY      as a child         Physical Exam   Patient Vitals for the past 24 hrs:   BP Temp Temp src Pulse Resp SpO2 Height Weight   09/09/24 1102 128/88 97  F (36.1  C) Oral 88 16 99 % 1.778 m (5' 10\") 86.2 kg (190 lb)       Physical Exam  Vitals: reviewed by me  General: Pt seen on [facility], pleasant, cooperative, and alert to conversation  Eyes: Tracking well, clear conjunctiva BL  ENT: MMM, midline trachea.   Lungs: No tachypnea, no accessory muscle use. No respiratory distress.   CV: Rate as above  MSK: no joint effusion.  No evidence of trauma  Skin: No rash  Neuro: Clear speech and no facial droop.  Psych: Not " RIS, no e/o AH/VH.  Does endorse SI, very tearful, however very forthright and open      ED Course      Medications Administered   Medications   clonazePAM (klonoPIN) tablet 0.5 mg (0.5 mg Oral $Given 9/9/24 1037)          Procedures      Discussion of Management   Yes I have requested a formal mental health evaluation with the DEC         Optional/Additional Documentation  Stress/Adjustment Disorders      Medical Decision Making / Diagnosis         MDM  This is a very pleasant 2-year-old male presents the emergency room with appears to be suicidal ideation with a plan.  He is here voluntarily, and states that currently right now this moment he has no desire to act on this plan but he is afraid that if his anxiety symptoms are not managed and he has a bad day, in his words, he may act on them.  For this reason I do think he is go to empath and that this is the next step in his management.  He has no medical complaints at this time, normal vital signs, and is mentating appropriately.  Will monitor very carefully till next empath bed is available.    ICD-10 Codes:    ICD-10-CM    1. Suicidal ideation  R45.851       2. MDD (major depressive disorder), recurrent severe, without psychosis (H)  F33.2       3. PAULA (generalized anxiety disorder)  F41.1                       Teo Espinoza MD  09/09/24 5526

## 2024-09-09 NOTE — PLAN OF CARE
Iraj Leija  September 9, 2024  Plan of Care Hand-off Note     Patient Care Path: observation    Plan for Care:   Pt presents with worsening of suicidal ideation with depression and anxiety. He feels hopeless about feeling the way he is and feels like a burden to others. He reports suicidal ideation for at least the past week and describes these as being intrusive and daily at times. He has been working with a therapist and is compliant with medication management. He reports this current episode of depression and anxiety are becoming more debilitating to where he has been losing motivation and interest. Says he just feels tired and exhaused with life. After therapeutic assessment, intervention and aftercare planning by ED care team and [facility] and in consultation with the attending provider, the patient's circumstances and mental state were appropriate for inpatient behavioral health. Patient is recommended by this clinician to admit IP  for safety and stabilization.    Identified Goals and Safety Issues: Pt will remain on observation at the [facility] for stabilization.  Pt will report to staff if he is feeling unsafe. Upon discharge from the hospital, the Pt could benefit from IOP or programmatic care.    Overview:  Baljit Leija (Father)  [phone] -- [phone]      Legal Status: Legal Status at Admission: Voluntary/Patient has signed consent for treatment      Primo Pearce, LICSW

## 2024-09-09 NOTE — ED TRIAGE NOTES
"Pt has been suicidal recently due to \"flare up in his anxety \" per patient  Pt has long hx of depression and anxiety   Pt plan is to use carbon monoxide   Pt contracts for safety         "

## 2024-09-09 NOTE — PROGRESS NOTES
Pt appears to be anxious and uncomfortable. Pt is tearful and reports he is scared and doesn't want to be here too much longer. Pt was offered PRN medication and was offered sensory room B. Pt was informed that he is one of the next one to be seen. Pt agreed to stay and wait at this time.

## 2024-09-09 NOTE — ED PROVIDER NOTES
EmPATH Unit - Psychiatric Consultation  [facility] Emergency Department    Iraj Leija MRN: [id number]   Age: 29 year old YOB: 1995     History     Chief Complaint   Patient presents with    Suicidal     HPI  Iraj Leija is a 29 year old male with history notable for major depressive disorder and a generalized anxiety disorder who presents to the emergency department reporting concern for heightened anxiety, depressed mood, and suicidal ideation.  He was determined to be medically stable and transferred to the EmPATH unit for psychiatric assessment.  He is now approaching 4 hours in the emergency department.  On examination, the patient reviews with me that sometime in May or June, symptoms of his anxiety disorder began to escalate leading to bouts of nausea and worsening depressed mood.  His outpatient provider added BuSpar which was later increased to help alleviate these symptoms.  He initially gained some benefit however symptoms returned a couple of weeks ago and have gradually worsened.  More recently, suicidal thoughts intensified to involve contemplation of various plans although he clarifies that he harbors no desire or intent to commit suicide.  He explains that he was worried that if he were to experience several bad days or a very bad week, he may be tempted by these thoughts of suicide.  He presents today as help seeking, desiring medication changes that can help lessen symptoms of his anxiety and depressive disorders.  He notes that his father responded very well to the Paxil which led to a change from Prozac to Paxil several years ago.  He has maintained a fair response to his antidepressant medication until recently as highlighted above.  He notes that he uses clonazepam on occasion to manage breakthrough anxiety although has needed this medication more often recently.  He did not report any misuse or abuse of the medication.  There was no indication of psychosis or homicidal  "thoughts.  The patient would like to discharge home today citing that resuming daily activities is a mode of coping with his mood and anxiety symptoms.  He is content with his current outpatient medication prescriber and therapist and will follow up with them as scheduled.    Past Medical History  Past Medical History:   Diagnosis Date    Depressive disorder      Past Surgical History:   Procedure Laterality Date    ADENOIDECTOMY      as a child     busPIRone (BUSPAR) 15 MG tablet  clonazePAM (KLONOPIN) 0.5 MG tablet  mirtazapine (REMERON) 15 MG tablet  PARoxetine (PAXIL) 40 MG tablet      No Known Allergies  Family History  Family History   Problem Relation Age of Onset    Diabetes Mother     Depression Mother     Anxiety Disorder Father      Social History   Social History     Tobacco Use    Smoking status: Never    Smokeless tobacco: Never   Substance Use Topics    Alcohol use: Yes     Alcohol/week: 0.0 standard drinks of alcohol     Comment: 2-3 beers/wk    Drug use: No          Review of Systems  A medically appropriate review of systems was performed with pertinent positives and negatives noted in the HPI, and all other systems negative.    Physical Examination   BP: 128/88  Pulse: 88  Temp: 97  F (36.1  C)  Resp: 16  Height: 177.8 cm (5' 10\")  Weight: 86.2 kg (190 lb)  SpO2: 99 %    Physical Exam  General: Appears stated age.   Neuro: Alert and fully oriented. Extremities appear to demonstrate normal strength on visual inspection.   Integumentary/Skin: no rash visualized, normal color    Psychiatric Examination   Appearance: awake, alert  Attitude:  cooperative  Eye Contact:  fair  Mood:  anxious and sad   Affect:  mood congruent and intensity is blunted  Speech:  clear, coherent  Psychomotor Behavior:  no evidence of tardive dyskinesia, dystonia, or tics  Thought Process:  logical and linear  Associations:  no loose associations  Thought Content:  no evidence of suicidal ideation or homicidal ideation and no " evidence of psychotic thought  Insight:  fair  Judgement:  fair  Oriented to:  time, person, and place  Attention Span and Concentration:  fair  Recent and Remote Memory:  fair  Language: able to name/identify objects without impairment  Fund of Knowledge: intact with awareness of current and past events    ED Course        Labs Ordered and Resulted from Time of ED Arrival to Time of ED Departure - No data to display    Assessments & Plan (with Medical Decision Making)   Patient presenting with concern for suicidal ideation, depressed mood, and anxiety.  Overall, the patient has responded favorably to antidepressant medications although symptom intensity has recently reemerged and has been complicated by GI side effects.  His treatment plan focused on ways to optimize antidepressant treatments to address these symptoms of concern. Nursing notes reviewed noting no acute issues.     I have reviewed the assessment completed by the [facility].     Preliminary diagnosis:    ICD-10-CM    1. Suicidal ideation  R45.851       2. MDD (major depressive disorder), recurrent severe, without psychosis (H)  F33.2       3. PAULA (generalized anxiety disorder)  F41.1            Treatment Plan:  -Begin Remeron 15 mg nightly for antianxiety and antidepressant treatment.  Noting that the patient has been experiencing some sexual side effects to Paxil and may be experiencing potential GI side effects related to treatment with multiple serotonergic medications, the addition of Remeron and its antiemetic properties may offer additional benefit.  If the patient gains a robust antidepressant effect, the dose of his serotonergic medications could potentially be reduced to help lessen the potential side effect burden.  We reviewed the importance of monitoring his weight and appetite while taking this medication.  His appetite has been poor and may additionally gain from these appetite enhancing effects.  -Continue Paxil 60 mg daily for antianxiety  and antidepressant treatment  -Continue BuSpar 15 mg twice a day for antianxiety treatment; depending on the patient's response to the medications above, dose reduction or discontinuation of BuSpar could be considered in the future to help minimize polypharmacy.  -Continue clonazepam 0.5 mg as needed for reduction of anxiety  -The patient was educated on the benefits of incorporating pharmacogenomics testing and his current medication plan to help rule out whether metabolism abnormalities have complicated his response to medications thus far.  He will follow-up with his outpatient provider to further discuss.  -The patient is requesting to discharge home today.  He currently does not meet criteria to be placed under an involuntary hold.  Therefore, we will proceed with discharge home and transition his care back to his outpatient providers.    After a period of working with the treatment team on the EmPATH unit, the patient's mental state improved to allow a safe transition to outpatient care. After counseling on the diagnosis, work-up, and treatment plan, the patient was discharged. Close follow-up with a psychiatrist and/or therapist was recommended and community psychiatric resources were provided. Patient is to return to the ED if any urgent or potentially life-threatening concerns.     At the time of discharge, the patient's acute suicide risk was determined to be low due to the following factors: Reduction in the intensity of mood/anxiety symptoms that preceded the admission, denial of suicidal thoughts, denies feeling helpless or helpless, not currently under the influence of alcohol or illicit substances, denies experiencing command hallucinations, no immediate access to firearms. The patient's acute risk could be higher if noncompliant with their treatment plan, medications, follow-up appointments or using illicit substances or alcohol. Protective factors include: social supports, stable housing,  employment    --  Vin Winter MD   [facility] EMERGENCY DEPT  EmPATH Unit       Vin Winter MD  09/09/24 [id number]

## 2024-09-10 ENCOUNTER — TELEPHONE (OUTPATIENT)
Dept: BEHAVIORAL HEALTH | Facility: CLINIC | Age: 29
End: 2024-09-10
Payer: COMMERCIAL

## 2024-09-10 NOTE — TELEPHONE ENCOUNTER
Called and left voicemail for patient regarding follow up, left EmPATH number if they would like additional assistance. No further follow-up is needed.